# Patient Record
(demographics unavailable — no encounter records)

---

## 2020-09-04 NOTE — NUR
SPOKE WITH PT, NOT CURRENTLY SEEING A PHYSICIAN OF ANY KIND, LIVES WITH MOTHER, GETS 
CONFUSED IN HIS ACCOUNTING OF WHAT HAPPENED TO GET HIM HERE. STATES JUST NEED A PRESCRIPTION 
FOR HIS ANXIETY ATTACKS. LET HIM KNOW THE MAT TEAM IS ON WAY TO ASSESS HIM FOR SERVICES.

## 2020-09-04 NOTE — EMERGENCY DEPARTMENT NOTE
History of Present Illnes


History of Present Illness


Chief Complaint:  Alcohol Abuse/Intoxication


History of Present Illness


This is a 21 year old  male brought by EMS for alcohol intoxicatoin. patietn was

found passed out by family members but was easily arousable. Police was called 

to the residence and patient fleed from scene. EMS arrived to scene and 

transported patient. Upon arrival, patient states that he was having SI .


Historian:  Paramedic/EMS


Onset (how long ago):  hour(s)


Severity:  mild


Onset quality:  gradual


Duration (how long):  hour(s)


Timing of current episode:  constant


Progression:  partially resolved


Chronicity:  recurrent


Context:  Denies recent illness, Denies recent surgery, Denies recent 

immobilization, Denies recent travel, Denies trauma/injury, Denies new 

medications, Denies hx of DVT/PE, Denies non-compliance w/ medications, Denies 

other


Relieving factors:  none


Exacerbating factors:  none


Associated symptoms:  Denies denies other symptoms, Denies confusion, Denies 

chest pain, Denies cough, Denies diaphoresis, Denies fever/chills, Denies 

headaches, Denies loss of appetite, Denies malaise, Denies nausea/vomiting, 

Denies rash, Denies seizure, Denies shortness of breath, Denies syncope, Denies 

weakness, Denies other (MICHELE GOMEZ DO)





Past Medical/Family History


Physician Review


I have reviewed the patient's past medical and family history.  Any updates have

been documented here.


 (MICHELE GOMEZ DO)





Past Medical History


Recent Fever:  No


Clinical Suspicion of Infectio:  No


New/Unexplained Change in Ment:  Yes


Past Medical History:  None


Past Surgical History:  None


 (MICHELE GOMEZ DO)





Social History


Smoking Cessation:  Current some day smoker


Alcohol Use:  Daily


Any Illegal Drug Use:  Yes (marijuana)


 (MICHELE GOMEZ DO)





Review of Systems


ROS Narrative


Unable to obtain ROS:  altered mental status


 (MICHELE GOMEZ DO)





Physical Exam


Related Data


Allergies:  


Coded Allergies:  


     No Known Allergies (Unverified , 9/4/20)


Triage Vital Signs





Vital Signs








  Date Time  Temp Pulse Resp B/P (MAP) Pulse Ox O2 Delivery O2 Flow Rate FiO2


 


9/4/20 02:13 99.1 128 18 119/87 100 Room Air  








Vital signs reviewed:  Yes


 (MICHELE GOMEZ DO)





Physical Exam


CONSTITUTIONAL





Constitutional:  Present well-developed, Present well-nourished, Present other 

(intoxicated)


HENT


HENT:  Present normocephalic, Present atraumatic, Present oropharynx 

clear/moist, Present nose normal


HENT L/R:  Present left ext ear normal, Present right ext ear normal


EYES





Eyes:  Reports PERRL, Reports conjunctivae normal


NECK


Neck:  Present ROM normal


PULMONARY


Pulmonary:  Present effort normal, Present breath sounds normal


CARDIOVASCULAR





Cardiovascular:  Present heart sounds normal, Present tachycardia


GASTROINTESTINAL





Abdominal:  Present soft, Present nontender, Present bowel sounds normal


GENITOURINARY





Genitourinary:  Present exam deferred


SKIN


Skin:  Present warm, Present dry


MUSCULOSKELETAL





Musculoskeletal:  Present ROM normal


NEUROLOGICAL





Neurological:  Present alert, Present oriented x 3, Present no gross motor or 

sensory deficits


PSYCHOLOGICAL


Psychological:  Present mood/affect normal, Present judgement normal 

(MICHELE GOMEZ DO)





Results


Laboratory


Result Diagram:  


9/4/20 0223





Laboratory





Laboratory Tests








Test


 9/4/20


02:23


 


White Blood Count


 8.65 x10e3/uL


(4.8-10.8)


 


Red Blood Count


 5.57 x10e6/uL


(4.3-5.7)


 


Hemoglobin


 15.0 g/dL


(14.0-18.0)


 


Hematocrit


 44.7 %


(38.2-49.6)


 


Mean Corpuscular Volume


 80.3 fL


(81-99)


 


Mean Corpuscular Hemoglobin


 26.9 pg


(28-32)


 


Mean Corpuscular Hemoglobin


Concent 33.6 g/dL


(31-35)


 


Red Cell Distribution Width


 12.3 %


(11.7-14.4)


 


Platelet Count


 239 x10e3/uL


(140-360)


 


Neutrophils (%) (Auto)


 52.5 %


(38.7-80.0)


 


Lymphocytes (%) (Auto)


 40.6 %


(18.0-39.1)


 


Monocytes (%) (Auto)


 5.7  %


(4.4-11.3)


 


Eosinophils (%) (Auto)


 0.8 %


(0.0-6.0)


 


Basophils (%) (Auto)


 0.2 %


(0.0-1.0)


 


Neutrophils # (Auto) 4.5 (2.1-6.9) 


 


Lymphocytes # (Auto) 3.5 (1.0-3.2) 


 


Monocytes # (Auto) 0.5 (0.2-0.8) 


 


Eosinophils # (Auto) 0.1 (0.0-0.4) 


 


Basophils # (Auto) 0.0 (0.0-0.1) 


 


Absolute Immature Granulocyte


(auto 0.02 x10e3/uL


(0-0.1)








Lab results reviewed:  Yes


 (MICHELE GOMEZ DO)





Assessment & Plan


Medical Decision Making


MDM


Diff Dx : drug intoxication, Suicide ideation, major depressive disorder, 

antisocial personality


 (MICHELE GOMEZ DO)


MDM


per dr gomez


 (ABNER MAYER MD)





Reassessment


Reassessment


i received report from Dr Gomez, EtOH level decreased to ~60, i re-evaluated pt -

he said he wasn't currently suicidal but then said he is worried about having to

go to alf and worried about his unborn child growing up without a father - will

have MAT team evaluate pt





MAT evaluated pt and feel he needs involuntary admission, feels he is at high-

risk for suicide at this time - there is a 2-3 week waiting list for admissions 

at [facility], so will admit to hospital with 1:1 sitter, have Psych Dr Owens see pt

- I spoke with Dr Alexis for admission and he agrees with plan


 (ABNER MAYER MD)


Assessment & Plan


Final Impression:  


(1) Suicidal ideation


(2) MDD (major depressive disorder)


(3) Polysubstance abuse (MICHELE GOMEZ DO)


Final Impression:  


(1) Suicidal ideation


(2) MDD (major depressive disorder) (ABNER MAYER MD)


Depart Disposition:  ADMITTED


Medications in the ED





Sodium Chloride 1,000 ml @  0 mls/hr Q0M STAT IV  Last administered on 9/4/20at 

02:31; Admin Dose 999 MLS/HR;  Start 9/4/20 at 02:24;  Stop 9/4/20 at 02:25;  

Status DC


Sodium Chloride 1,000 ml @  0 mls/hr Q0M STAT IV  Last administered on 9/4/20at 

02:31; Admin Dose 999 MLS/HR;  Start 9/4/20 at 02:24;  Stop 9/4/20 at 02:25;  

Status DC


Sodium Chloride 2,000 ml @  ud STK-MED ONCE .ROUTE ;  Start 9/4/20 at 02:31;  

Stop 9/4/20 at 02:25;  Status DC


 (MICHELE GOMEZ DO)











MICHELE GOMEZ DO                 Sep 4, 2020 02:44


ABNER MAYER MD                Sep 4, 2020 11:03

## 2020-09-04 NOTE — NUR
patient sleeping in bed at this time, no distress noted, pt was placed in paper 
scrubs by previous shift, 1:1 sitter at bedside

## 2020-09-04 NOTE — XMS REPORT
Continuity of Care Document

                             Created on: 2020



FROILAN SNYDER

External Reference #: [id number]

: 1999

Sex: Male



Demographics





                          Address                   [address]

[place]  [address]

 

                          Home Phone                [phone]

 

                          Preferred Language        English

 

                          Marital Status            Unknown

 

                          Temple Affiliation     Unknown

 

                          Race                      Unknown

 

                          Ethnic Group              Unknown





Author





                          Author                    [facility]

t

 

                          Organization              [facility]

 

                          Address                   [address]

[place]  [address]



 

                          Phone                     Unavailable







Support





                Name            Relationship    Address         Phone

 

                    FITZ SANTOS    PRS                 UNKNOWN

[place]  [address]                     [phone]

 

                    FITZ SANTOS    PRS                 UNKNOWN

[place]  [address]                     [phone]

 

                    ERIBERTO SANTOS   PRS                 UNKNOWN

[place]  [address]                     [phone]

 

                    FROILAN SNYDER       PRS                 [id number]

[place]  [address]                     [phone]

 

                    FITZ SNYDER     PRS                 [id number]

[place]  [address]                     [phone]

 

                    FROILAN SNYDER       PRS                 [address]

[address]

[place]  [address]                     [phone]

 

                    FITZ SNYDER     PRS                 [address]

[address]

[place]  [address]                     [phone]







Care Team Providers





                    Care Team Member Name Role                Phone

 

                          Unavailable               Unavailable







Payers





           Payer Name Policy Type Policy Number Effective Date Expiration Date S

ource







Problems

This patient has no known problems.



Allergies, Adverse Reactions, Alerts





        Allergy Name Allergy Type Status  Severity Reaction(s) Onset Date Inacti

ve Date 

Treating Clinician        Comments                  Source

 

       No Known Allergies DA     Active U             2020 00:00:00       

               [facility]

 

       No Known Allergies DA     Active U             2018-10-21 00:00:00       

               [facility]

 

       No Known Allergies DA     Active U             2016 00:00:00       

               [facility]







Medications

This patient has no known medications.



Procedures

This patient has no known procedures.



Results





           Test Description Test Time  Test Comments Results    Result Comments 

Source

 

                    Novel Coronavirus 2019 nCoV 2020 13:22:00   

 

                                        Test Item

 

             Novel Coronavirus 2019 nCoV (test code = COVID19) Negative         

                       





Does patient have the clinical criteria consistent with COVID-19? YIs the patien
t going to be discharged home? YSTREPTOCOCCUS PCR [id number] 09:12:00* 



             Test Item    Value        Reference Range Interpretation Comments

 

             STREPTOCOCCUS DYSGALACTIAE (test code = STREPGC) NEGATIVE FOR G/C N

EGATIVE                   

 

             STREPA MOLECULAR (test code = STREPAMOL) NEGATIVE FOR GRP A NEGATIV

E                   





- XR CHEST 1 -27-07 00:28:00  Name: FROILAN SNYDER                        
[facility]    : 1999 Age/S:21  /M            [address] [place] [address]           Unit#:[id number]     Loc: [facility] [address]               Phys: Bre Schaefer MD                                  
               Acct#: [id number] Dis Date:                   PHONE #: 
[phone]      Status: PRE ER                                   FAX #: 
[phone]      Exam Date: 2020           Reason: COUGH AND SORE THROAT 
                             EXAMS:                                             
 CPT CODE:      [id number] XR CHEST 1 V                               81571      
             EXAM:  - XR CHEST 1 V               COMPARISON: 10/6/2010          
    LOCATION: H57               HISTORY: 21 years-old Male with COUGH AND SORE 
THROAT               FINDINGS:               The cardiomediastinal silhouette is
within normal limits.  The lungs       are well aerated.  No large pneumothorax 
or pleural effusion.  Osseous       structures and soft tissues demonstrate no 
acute findings.  The       visualized upper abdomen is unremarkable.            
    IMPRESSION:                   No acute cardiopulmonary abnormality.         
      ** Electronically Signed by Clayton Lao M.D. **        **       
        on 2020 at 0028                **                      Reported 
and signed by: Clayton Lao M.D.                     CC: Olivia Hansen                                                                             
                                          Technologist: DAMON HALEY 
RT(R),CT                             Trnscrpt Data: 2020 (0028) [email or web address]
                        Orig Print D/T: S: 2020 (0031)                    
    PAGE  1                       Signed Report                               - 
XR HAND 3 + V [id number] 22:43:00  Name: FROILAN SNYDER [facility]    : 1999 Age/S:20  /M            [address] [place] [address]           Unit#:[id number]     Loc: [place] [address]               Phys: Renu Houser  GET                                 
               Acct#: [id number] Dis Date:                   PHONE #: 
[phone]      Status: REG ER                                   FAX #: 
[phone]      Exam Date: 2019           Reason: punched a wall 3 days 
ago                           EXAMS:                                            
  CPT CODE:      [id number] XR HAND 3 + V LT                           969[address]     
              HISTORY: Pain after trauma.               COMPARISON: X-ray from 
2016.               Location: [facility].               3 views of the left 
hand:               No acute fracture or dislocation.  Joint spaces are 
preserved.  No AVN       of the lunate or the scaphoid bones.                 
IMPRESSION:                   No acute fracture or dislocation.  Joint spaces 
are preserved.          ** Electronically Signed by RUDY Braga on 
2019 at 3191 **                      Reported and signed by: Farhad Braga M.D.                          CC: Olivia Hansen                              
                                                                                
        Technologist: BENY SALAZAR RT(R),RDMS,CT                            
Trnscrpt Data: 2019 (7159) [email or web address]                          Orig Print 
D/T: S: 2019 (8286)                         PAGE  1                       
Signed Report

## 2020-09-04 NOTE — XMS REPORT
Continuity of Care Document

                             Created on: 2020



FROILAN SNYDER

External Reference #: [id number]

: 1999

Sex: Male



Demographics





                          Address                   [address]

[place]  [address]

 

                          Home Phone                [phone]

 

                          Preferred Language        English

 

                          Marital Status            Unknown

 

                          Orthodoxy Affiliation     Unknown

 

                          Race                      Unknown

 

                          Ethnic Group              Unknown





Author





                          Author                    [facility]

t

 

                          Organization              [facility]

 

                          Address                   [address]

[place]  [address]



 

                          Phone                     Unavailable







Support





                Name            Relationship    Address         Phone

 

                    FITZ SANTOS    PRS                 UNKNOWN

[place]  [address]                     [phone]

 

                    FITZ SANTOS    PRS                 UNKNOWN

[place]  [address]                     [phone]

 

                    ERIBERTO SANTOS   PRS                 UNKNOWN

[place]  [address]                     [phone]

 

                    FROILAN SNYDER       PRS                 [id number]

[place]  [address]                     [phone]

 

                    FITZ SNYDER     PRS                 [address]

[place]  [address]                     [phone]

 

                    FROILAN SNYDER       PRS                 [address]

[address]

[place]  [address]                     [phone]

 

                    FITZ SNYDER     PRS                 [address]

[address]

[place]  [address]                     [phone]







Care Team Providers





                    Care Team Member Name Role                Phone

 

                          Unavailable               Unavailable







Payers





           Payer Name Policy Type Policy Number Effective Date Expiration Date S

ource







Problems

This patient has no known problems.



Allergies, Adverse Reactions, Alerts





        Allergy Name Allergy Type Status  Severity Reaction(s) Onset Date Inacti

ve Date 

Treating Clinician        Comments                  Source

 

       No Known Allergies DA     Active U             2020 00:00:00       

               [facility]

 

       No Known Allergies DA     Active U             2018-10-21 00:00:00       

               [facility]

 

       No Known Allergies DA     Active U             2016 00:00:00       

               [facility]







Medications

This patient has no known medications.



Procedures

This patient has no known procedures.



Results





           Test Description Test Time  Test Comments Results    Result Comments 

Source

 

                    Novel Coronavirus 2019 nCoV 2020 13:22:00   

 

                                        Test Item

 

             Novel Coronavirus 2019 nCoV (test code = COVID19) Negative         

                       





Does patient have the clinical criteria consistent with COVID-19? YIs the patien
t going to be discharged home? YSTREPTOCOCCUS PCR [id number] 09:12:00* 



             Test Item    Value        Reference Range Interpretation Comments

 

             STREPTOCOCCUS DYSGALACTIAE (test code = STREPGC) NEGATIVE FOR G/C N

EGATIVE                   

 

             STREPA MOLECULAR (test code = STREPAMOL) NEGATIVE FOR GRP A NEGATIV

E                   





- XR CHEST 1 -49-45 00:28:00  Name: FROILAN SNYDER                        
[facility]    : 1999 Age/S:21  /M            [address] [place] [address]           Unit#:[id number]     Loc: [facility] [place] [address]               Phys: Bre Schaefer MD                                  
               Acct#: [id number] Dis Date:                   PHONE #: 
[phone]      Status: PRE ER                                   FAX #: 
[phone]      Exam Date: 2020           Reason: COUGH AND SORE THROAT 
                             EXAMS:                                             
 CPT CODE:      [id number] XR CHEST 1 V                               [address]      
             EXAM:  - XR CHEST 1 V               COMPARISON: 10/6/2010          
    LOCATION: H57               HISTORY: 21 years-old Male with COUGH AND SORE 
THROAT               FINDINGS:               The cardiomediastinal silhouette is
within normal limits.  The lungs       are well aerated.  No large pneumothorax 
or pleural effusion.  Osseous       structures and soft tissues demonstrate no 
acute findings.  The       visualized upper abdomen is unremarkable.            
    IMPRESSION:                   No acute cardiopulmonary abnormality.         
      ** Electronically Signed by Clayton Lao M.D. **        **       
        on 2020 at 0028                **                      Reported 
and signed by: Clayton Lao M.D.                     CC: Olivia Hansen                                                                             
                                          Technologist: DAMON HALEY 
RT(R),CT                             Trnscrpt Data: 2020 (0028) [email or web address]
                        Orig Print D/T: S: 2020 (0031)                    
    PAGE  1                       Signed Report                               - 
XR HAND 3 + V [id number] 22:43:00  Name: FROILAN SNYDER [facility]    : 1999 Age/S:20  /M            [address] [place] [place] [address]           Unit#:[id number]     Loc: [place] [address]               Phys: Renu Houser  GET                                 
               Acct#: [id number] Dis Date:                   PHONE #: 
[phone]      Status: REG ER                                   FAX #: 
[phone]      Exam Date: 2019           Reason: punched a wall 3 days 
ago                           EXAMS:                                            
  CPT CODE:      [id number] XR HAND 3 + V LT                           [address]     
              HISTORY: Pain after trauma.               COMPARISON: X-ray from 
2016.               Location: [facility].               3 views of the left 
hand:               No acute fracture or dislocation.  Joint spaces are 
preserved.  No AVN       of the lunate or the scaphoid bones.                 
IMPRESSION:                   No acute fracture or dislocation.  Joint spaces 
are preserved.          ** Electronically Signed by RUDY Braga on 
2019 at 1232 **                      Reported and signed by: Farhad Braga M.D.                          CC: Olivia Hansen                              
                                                                                
        Technologist: BENY SALAZAR RT(R),RDMS,CT                            
Trnscrpt Data: 2019 (3132) AnuTH4                          Orig Print 
D/T: S: 2019 (3952)                         PAGE  1                       
Signed Report

## 2020-09-04 NOTE — NUR
Dr. Coreas at bedside, patient states that he is not feeling suicidal at this 
time, denies A/V hallucinations, pt states that he had a mental breakdown last 
night and ended up drinking a bottle of liquor, states that he is going to be 
"locked up soon and I just want my kid to be taken care of"

## 2020-09-04 NOTE — NUR
PT SLEEPING AT THIS TIME. NAD NOTED AT THIS TIME. BED IS LOCKED AND IN LOWEST 
POSITION. WILL CONTINUE TO MONITOR.

## 2020-09-04 NOTE — NUR
MAT TEAM HERE TO ACCESS PT, THERE IS A 2 WEEK WAIT LIST FOR [facility] NON FUNDED.

## 2020-09-04 NOTE — NUR
CALLED MAT TEAM AND SPOKE WITH KENAN, CALLED IN FOR ASSESSMENT ON THIS PT. GAVE LEVELS OF 
LABS. THEY WILL SEND OUT CLINICIAN FOR ASSESSMENT, CALLED AND NOTIFIED STAFF IN ED. ALSO LET 
KNOW PT IS SELF PAY. PLEASE CHECK BENEFITS.

## 2020-09-05 NOTE — NUR
ASSUMED CARE. AAOX3. ACYANOTIC. RESTING IN BED. SITTER PRESENT AT BEDSIDE. NO DISTRESS 
NOTED. CALL LIGHT IN REACH. SIDE RAILS UP X2. BED LOW AND LOCKED.

## 2020-09-05 NOTE — DISCHARGE SUMMARY
The patient does not have a primary care physician. 

 

The patient was taken out AMA by his family.

 

ADMITTING DIAGNOSES:  

1. Drug and alcohol overdose.

2. Suicide attempt.

3. Depression.

 

DISCHARGE DIAGNOSES:  

1. Drug and alcohol overdose.

2. Suicide attempt.

3. Depression.

 

BRIEF HISTORY:  Mr. Valentin is a 21-year-old gentleman, had a breakup with his

girlfriend, who is pregnant and got some alcohol and some Xanax, and attempted suicide

with an overdose.  The patient brought in very lethargic.  He was observed overnight and

the following day, he was awake, alert, and oriented.  He was seen by Psychiatry, who

felt the patient needed mandatory admission.  However, his warrant has not been signed

by the .  The family is going to take the patient out AMA.  They have signed AMA

form that includes the fact that they will assume full responsibility for his actions

and the consequences, that they will assume full responsibility 

for his medical and psychiatric care, and that they will ensure that he receives

outpatient followup by the psychiatrist of their choice. 

 

 

 

 

______________________________

MD ROBERT Noble/JOSE

D:  09/05/2020 14:21:11

T:  09/05/2020 16:06:10

Job #:  [id number]

## 2020-09-05 NOTE — NUR
CALL PLACED TO DR. GALVIN'S ANSWERING SERVICE TO NOTIFY HIM OF ROUTINE CONSULT. 
REPRESENTATIVE SAID, "I WILL PAGE HIM OUT"

## 2020-09-05 NOTE — NUR
REPORT GIVEN TO DAYSHIFT NURSE. ALERT AND RESTING IN BED. NO SIGNS IV INFILTRATION. BED 
LOCKED AND IN LOW POSITION. CALL LIGHT WITHIN REACH. SITTER AT BEDSIDE.

## 2020-09-05 NOTE — CONSULTATION
DATE OF CONSULTATION:  

 

Psychiatry Consult 

 

REASON FOR CONSULTATION:  For treatment and evaluation of the patient's depression and

suicidal ideation. 

 

HISTORY OF PRESENT ILLNESS:  The patient is a 21-year-old  male, who is admitted

to [facility] because he overdosed on alcohol and Xanax in a

suicide attempt.  Psychiatry Consult is called to evaluate the patient's depression and

suicidal ideation. 

 

Upon evaluation today, the patient is found to be sitting on his bed.  He is alert,

awake, oriented to situation.  He states that he broke up with his girlfriend and become

very depressed.  He went to his sister's house where he started drinking alcohol and

took some bars and the next thing he knew that he was in the hospital.  He also stated

that he made suicidal comments when he was intoxicated.  He became tearful about talking

about his friend, who recently  in a car accident.  The patient sometimes feels

hopeless and helpless.  He states he is not able to sleep and eat very well.  He denies

any hallucinations.  He is minimizing his psychiatric symptoms and does not want to go

to inpatient's psychiatry at this time. 

 

PAST PSYCHIATRIC HISTORY:  The patient states he has never been treated by psychiatrist

in the past.  He claims that he does not drink alcohol regularly and he smokes marijuana

on and off.  He denies abusing any prescription medications or recreational drugs. 

 

FAMILY HISTORY:  The patient denies any family history of psychiatric illness.

 

SOCIAL HISTORY:  The patient states that he lives with his mother.

 

LABORATORY DATA:  Current labs, WBC 8.03, hemoglobin 14.6, hematocrit 45.3, platelets

218.  Sodium 139, potassium 3.5, chloride 101, BUN 8.  Urine toxicology is positive for

marijuana and benzos. 

 

MENTAL STATUS EXAMINATION:  The patient is young  male, who is currently sitting

on his bed.  He is restless, but cooperative.  His mood is depressed and anxious with

appropriate affect. He denies any suicidal or homicidal ideation at present. He denies

any abnormal perception at present.  No dilutions are elicited.  His thought process is

goal directed.  His insight and judgment are limited. 

 

DIAGNOSES:  AXIS I:

1. Major depressive disorder, single episode, severe without psychosis.

2. Alcohol and benzo abuse.

 

PLAN OF CARE:  

1. Add Zoloft 50 mg p.o. daily.

2. Recommended total abstinence from alcohol and drugs.

3. Supportive therapy.

4. Recommended inpatient psychiatry, but the patient refused.

5. Continue with MAT team assessment. 

Thank you very much for this consult.

 

 

 

 

______________________________

MD JAJA Ball/MODL

D:  2020 13:44:15

T:  2020 20:09:44

Job #:  [id number]

## 2020-09-05 NOTE — NUR
PATIENT DEPARTED FROM UNIT AT APPROXIMATELY 1645 WITH MOTHER. BOTH SIGNED AMA DOCUMENT WITH 
UNDERSTANDING THAT IT IS RECOMMENDED THAT THE PATIENT RECEIVED PSYCHIATRIC CARE. MOTHER, 
FITZ, ASSUMED RESPONSIBLITY FOR PATIENT AND ENSURES THAT SHE WILL HELP PATIENT RECEIVE 
TREATMENT WITH DR. MALENA CHANEY. NO DISTRESS NOTED. PATIENT DENIES SUICIDAL IDEATION.

## 2020-09-07 NOTE — NUR
NOTIFIED PT LEFT AMA, NOTIFIED MAT TEAM TO DISMISS WARRANT AND MELONY, ALSO LET NURSING 
SUPERVISION KNOW THAT THIS WAS NOT REPORTED TO THE MAT TEAM.